# Patient Record
Sex: FEMALE | Race: WHITE | NOT HISPANIC OR LATINO | URBAN - NONMETROPOLITAN AREA
[De-identification: names, ages, dates, MRNs, and addresses within clinical notes are randomized per-mention and may not be internally consistent; named-entity substitution may affect disease eponyms.]

---

## 2019-01-11 ENCOUNTER — IMPORTED ENCOUNTER (OUTPATIENT)
Dept: URBAN - NONMETROPOLITAN AREA CLINIC 1 | Facility: CLINIC | Age: 79
End: 2019-01-11

## 2019-01-11 PROBLEM — H02.834: Noted: 2019-01-11

## 2019-01-11 PROBLEM — H02.831: Noted: 2019-01-11

## 2019-01-11 PROBLEM — H25.813: Noted: 2019-01-11

## 2019-01-11 PROCEDURE — 92014 COMPRE OPH EXAM EST PT 1/>: CPT

## 2019-02-06 ENCOUNTER — IMPORTED ENCOUNTER (OUTPATIENT)
Dept: URBAN - NONMETROPOLITAN AREA CLINIC 1 | Facility: CLINIC | Age: 79
End: 2019-02-06

## 2019-02-06 PROBLEM — Z01.818: Noted: 2019-02-06

## 2019-02-06 PROBLEM — I10: Noted: 2019-02-06

## 2019-02-06 PROBLEM — F32.9: Noted: 2019-02-06

## 2019-02-06 PROBLEM — F41.1: Noted: 2019-02-06

## 2019-02-21 ENCOUNTER — IMPORTED ENCOUNTER (OUTPATIENT)
Dept: URBAN - NONMETROPOLITAN AREA CLINIC 1 | Facility: CLINIC | Age: 79
End: 2019-02-21

## 2019-02-21 PROBLEM — Z98.42: Noted: 2019-02-21

## 2019-02-21 PROBLEM — H25.811: Noted: 2019-02-21

## 2019-02-21 PROCEDURE — 66984 XCAPSL CTRC RMVL W/O ECP: CPT

## 2019-02-21 PROCEDURE — 92136 OPHTHALMIC BIOMETRY: CPT

## 2019-02-21 PROCEDURE — 99024 POSTOP FOLLOW-UP VISIT: CPT

## 2019-02-21 NOTE — PATIENT DISCUSSION
Cataract(s)-Visually significant cataract OD . -Cataract(s) causing symptomatic impairment of visual function not correctable with a tolerable change in glasses or contact lenses lighting or non-operative means resulting in specific activity limitations and/or participation restrictions including but not limited to reading viewing television driving or meeting vocational or recreational needs. -Expectation is clearer vision and functional improvement in symptoms as well as reduced glare disability after cataract removal.-Recommend Standard/Traditional based on previous OPD testing and lifestyle questionnaire.-All questions were answered regarding surgery including pre and post-op medications appointments activity restrictions and anesthetic usage.-The risks benefits and alternatives and special risk factors for the patient were discussed in detail including but not limited to: bleeding infection retinal detachment vitreous loss problems with the implant and possible need for additional surgery.-Although rare the possibility of complete vision loss was discussed.-The need for glasses post-operatively was discussed.-Patient elects to proceed with cataract surgery OD . Will schedule at patient's convenience. Same Day POV CE OS - Standard - The pt has undergone successful cataract extraction with Intraocular lens implantation in the left eye.- PO examination is normal - Instructed patient not to rub the eye and don't go swimming. - Pt should return in 1 week for follow up. - Ocular meds plan discussed and patient received printed take home instructions.

## 2019-02-28 ENCOUNTER — IMPORTED ENCOUNTER (OUTPATIENT)
Dept: URBAN - NONMETROPOLITAN AREA CLINIC 1 | Facility: CLINIC | Age: 79
End: 2019-02-28

## 2019-02-28 PROBLEM — Z98.42: Noted: 2019-02-28

## 2019-02-28 PROCEDURE — 99024 POSTOP FOLLOW-UP VISIT: CPT

## 2019-02-28 NOTE — PATIENT DISCUSSION
1 week s/p PCIOL OS-Pt doing well at 1 week s/p PCIOL. -Continue post-op gtts according to instruction sheet.-Okay to resume usual activites and d/c eye shield.

## 2019-03-07 ENCOUNTER — IMPORTED ENCOUNTER (OUTPATIENT)
Dept: URBAN - NONMETROPOLITAN AREA CLINIC 1 | Facility: CLINIC | Age: 79
End: 2019-03-07

## 2019-03-07 PROBLEM — Z98.42: Noted: 2019-03-07

## 2019-03-07 PROBLEM — Z98.41: Noted: 2019-03-07

## 2019-03-07 PROCEDURE — 99024 POSTOP FOLLOW-UP VISIT: CPT

## 2019-03-07 PROCEDURE — 92136 OPHTHALMIC BIOMETRY: CPT

## 2019-03-07 PROCEDURE — 66984 XCAPSL CTRC RMVL W/O ECP: CPT

## 2019-03-07 NOTE — PATIENT DISCUSSION
s/p PCIOL Same Day POV CE OD Standard 3/7/19-Pt doing well s/p PCIOL. -Continue post-op gtts according to instruction sheet and sleep with eye shield over eye for 7 nights.-Avoid bending at the waist lifting anything over 5lbs and dirty or estrella environments. -RTC: 1 Week POV s/p PCIOL CE OS Standard 2/21/19-Pt is doing well s/p PCIOL -Continue all post op gtts as instructed -Continue to monitor

## 2019-03-13 ENCOUNTER — IMPORTED ENCOUNTER (OUTPATIENT)
Dept: URBAN - NONMETROPOLITAN AREA CLINIC 1 | Facility: CLINIC | Age: 79
End: 2019-03-13

## 2019-03-13 PROCEDURE — 99024 POSTOP FOLLOW-UP VISIT: CPT

## 2019-03-13 NOTE — PATIENT DISCUSSION
1 week s/p PCIOL OD; Standard (3/7/19)-Pt doing well at 1 week s/p PCIOL. -Continue post-op gtts according to instruction sheet.-Okay to resume usual activites and d/c eye shield. s/p PCIOL CE OS Standard 2/21/19-Pt is doing well s/p PCIOL -Continue all post op gtts as instructed -Continue to monitor

## 2020-07-01 ENCOUNTER — IMPORTED ENCOUNTER (OUTPATIENT)
Dept: URBAN - NONMETROPOLITAN AREA CLINIC 1 | Facility: CLINIC | Age: 80
End: 2020-07-01

## 2020-07-01 PROBLEM — Z96.1: Noted: 2020-07-01

## 2020-07-01 PROBLEM — H35.62: Noted: 2020-07-01

## 2020-07-01 PROCEDURE — 99214 OFFICE O/P EST MOD 30 MIN: CPT

## 2020-07-01 NOTE — PATIENT DISCUSSION
Retinal Hemorrhage OS- No apparent RD OS seen clincally. - Recommend patient limit activity tonight.- Recommend further evaluation with [facility] tomorrow for B-Scan and medical management.

## 2022-04-16 ASSESSMENT — VISUAL ACUITY
OS_SC: 20/70
OD_PAM: 20/20
OD_PH: 20/20-
OD_PH: 20/40
OD_GLARE: 20/200
OD_PH: 20/30
OS_PH: 20/40
OD_CC: 20/40
OD_GLARE: 20/200
OD_CC: 20/60
OS_CC: 20/30-
OS_AM: 20/20
OD_CC: 20/40
OD_PH: 20/40-
OS_CC: 20/70
OD_SC: 20/60
OD_PAM: 20/20
OS_GLARE: 20/200
OD_CC: 20/40
OS_CC: 20/30-
OS_CC: 20/80-
OS_CC: 20/30-
OD_CC: J1+-
OS_CC: J2
OS_PH: 20/50-

## 2022-04-16 ASSESSMENT — TONOMETRY
OD_IOP_MMHG: 18
OS_IOP_MMHG: 17
OS_IOP_MMHG: 18
OS_IOP_MMHG: 14
OS_IOP_MMHG: 17
OD_IOP_MMHG: 18
OD_IOP_MMHG: 17
OD_IOP_MMHG: 17
OD_IOP_MMHG: 14
OD_IOP_MMHG: 16
OS_IOP_MMHG: 17
OS_IOP_MMHG: 16

## 2023-08-09 ENCOUNTER — NEW PATIENT (OUTPATIENT)
Dept: RURAL CLINIC 3 | Facility: CLINIC | Age: 83
End: 2023-08-09

## 2023-08-09 DIAGNOSIS — H34.8320: ICD-10-CM

## 2023-08-09 PROCEDURE — 99204 OFFICE O/P NEW MOD 45 MIN: CPT

## 2023-08-09 ASSESSMENT — VISUAL ACUITY
OU_CC: 20/30
OS_CC: CF 1FT
OD_CC: 20/30-1

## 2023-08-09 ASSESSMENT — TONOMETRY
OS_IOP_MMHG: 18
OD_IOP_MMHG: 18

## 2024-08-13 ENCOUNTER — COMPREHENSIVE EXAM (OUTPATIENT)
Dept: RURAL CLINIC 3 | Facility: CLINIC | Age: 84
End: 2024-08-13

## 2024-08-13 DIAGNOSIS — H43.813: ICD-10-CM

## 2024-08-13 DIAGNOSIS — H52.4: ICD-10-CM

## 2024-08-13 DIAGNOSIS — Z96.1: ICD-10-CM

## 2024-08-13 DIAGNOSIS — H35.3131: ICD-10-CM

## 2024-08-13 DIAGNOSIS — H26.493: ICD-10-CM

## 2024-08-13 PROCEDURE — 92014 COMPRE OPH EXAM EST PT 1/>: CPT

## 2024-08-13 PROCEDURE — 92250 FUNDUS PHOTOGRAPHY W/I&R: CPT

## 2024-08-13 PROCEDURE — 92015 DETERMINE REFRACTIVE STATE: CPT

## 2024-08-13 ASSESSMENT — TONOMETRY
OS_IOP_MMHG: 20
OD_IOP_MMHG: 21

## 2024-08-13 ASSESSMENT — VISUAL ACUITY
OS_CC: CF 4FT
OD_CC: 20/30

## 2024-09-27 NOTE — PATIENT DISCUSSION
"- Episode of transient vision loss ""graying of vision"" Head, normocephalic, atraumatic, Face, Face within normal limits, Ears, External ears within normal limits, Nose/Nasopharynx, External nose normal appearance, nares patent, no nasal discharge, Mouth and Throat, Oral cavity appearance normal, Lips, Appearance normal

## 2025-04-16 ENCOUNTER — FOLLOW UP (OUTPATIENT)
Age: 85
End: 2025-04-16

## 2025-04-16 DIAGNOSIS — H43.813: ICD-10-CM

## 2025-04-16 DIAGNOSIS — Z96.1: ICD-10-CM

## 2025-04-16 DIAGNOSIS — H34.8322: ICD-10-CM

## 2025-04-16 DIAGNOSIS — H26.493: ICD-10-CM

## 2025-04-16 DIAGNOSIS — H35.3131: ICD-10-CM

## 2025-04-16 PROCEDURE — 92134 CPTRZ OPH DX IMG PST SGM RTA: CPT

## 2025-04-16 PROCEDURE — 99214 OFFICE O/P EST MOD 30 MIN: CPT
